# Patient Record
Sex: FEMALE | Race: WHITE | NOT HISPANIC OR LATINO | Employment: FULL TIME | URBAN - METROPOLITAN AREA
[De-identification: names, ages, dates, MRNs, and addresses within clinical notes are randomized per-mention and may not be internally consistent; named-entity substitution may affect disease eponyms.]

---

## 2024-01-18 PROBLEM — M21.70 UNEQUAL LEG LENGTH: Status: ACTIVE | Noted: 2022-05-24

## 2024-01-18 PROBLEM — M25.552 PAIN IN LEFT HIP: Status: ACTIVE | Noted: 2020-08-24

## 2024-01-18 PROBLEM — R05.3 CHRONIC COUGH: Status: ACTIVE | Noted: 2024-01-18

## 2024-01-18 PROBLEM — J30.1 ALLERGIC RHINITIS DUE TO POLLEN: Status: ACTIVE | Noted: 2024-01-18

## 2024-01-18 PROBLEM — R92.8 ABNORMAL MAMMOGRAM: Status: ACTIVE | Noted: 2017-11-27

## 2024-01-18 PROBLEM — R21 RASH: Status: ACTIVE | Noted: 2024-01-18

## 2024-01-18 PROBLEM — R00.0 SINUS TACHYCARDIA: Status: ACTIVE | Noted: 2024-01-18

## 2024-01-18 PROBLEM — E66.3 OVERWEIGHT (BMI 25.0-29.9): Status: ACTIVE | Noted: 2024-01-18

## 2024-01-18 PROBLEM — H10.45 CHRONIC ALLERGIC CONJUNCTIVITIS: Status: ACTIVE | Noted: 2024-01-18

## 2024-01-18 PROBLEM — J30.81 ALLERGIC RHINITIS DUE TO DOGS: Status: ACTIVE | Noted: 2024-01-18

## 2024-01-18 PROBLEM — R12 PYROSIS: Status: ACTIVE | Noted: 2024-01-18

## 2024-01-18 PROBLEM — M79.661 RIGHT CALF PAIN: Status: ACTIVE | Noted: 2024-01-18

## 2024-01-18 PROBLEM — J30.1 ALLERGY TO TREES: Status: ACTIVE | Noted: 2024-01-18

## 2024-01-18 PROBLEM — M19.071 PRIMARY OSTEOARTHRITIS OF RIGHT FOOT: Status: ACTIVE | Noted: 2022-05-03

## 2024-01-18 PROBLEM — F41.9 ANXIETY: Status: ACTIVE | Noted: 2024-01-18

## 2024-01-18 PROBLEM — J30.9 ALLERGIC RHINITIS: Status: ACTIVE | Noted: 2024-01-18

## 2024-01-18 PROBLEM — I20.9 ANGINA PECTORIS (CMS-HCC): Status: ACTIVE | Noted: 2024-01-18

## 2024-01-18 PROBLEM — N30.01 ACUTE CYSTITIS WITH HEMATURIA: Status: ACTIVE | Noted: 2024-01-18

## 2024-01-18 PROBLEM — M47.812 CERVICAL SPONDYLOSIS: Status: ACTIVE | Noted: 2020-08-24

## 2024-01-18 PROBLEM — M25.561 RIGHT KNEE PAIN: Status: ACTIVE | Noted: 2024-01-18

## 2024-01-18 PROBLEM — K21.9 GERD (GASTROESOPHAGEAL REFLUX DISEASE): Status: ACTIVE | Noted: 2021-03-26

## 2024-01-18 PROBLEM — M87.052 AVASCULAR NECROSIS OF HIP, LEFT (MULTI): Status: ACTIVE | Noted: 2021-03-24

## 2024-01-18 PROBLEM — Z96.642 STATUS POST TOTAL REPLACEMENT OF LEFT HIP: Status: ACTIVE | Noted: 2021-04-19

## 2024-01-18 PROBLEM — I10 BENIGN ESSENTIAL HYPERTENSION: Status: ACTIVE | Noted: 2024-01-18

## 2024-01-18 PROBLEM — I10 HTN (HYPERTENSION): Status: ACTIVE | Noted: 2021-03-26

## 2024-01-19 ENCOUNTER — OFFICE VISIT (OUTPATIENT)
Dept: PRIMARY CARE | Facility: CLINIC | Age: 69
End: 2024-01-19
Payer: COMMERCIAL

## 2024-01-19 VITALS
BODY MASS INDEX: 28.52 KG/M2 | DIASTOLIC BLOOD PRESSURE: 80 MMHG | WEIGHT: 155 LBS | TEMPERATURE: 97.6 F | HEIGHT: 62 IN | HEART RATE: 77 BPM | OXYGEN SATURATION: 99 % | SYSTOLIC BLOOD PRESSURE: 124 MMHG

## 2024-01-19 DIAGNOSIS — N39.0 URINARY TRACT INFECTION WITHOUT HEMATURIA, SITE UNSPECIFIED: Primary | ICD-10-CM

## 2024-01-19 DIAGNOSIS — R30.0 DYSURIA: ICD-10-CM

## 2024-01-19 LAB
POC APPEARANCE, URINE: CLEAR
POC BILIRUBIN, URINE: NEGATIVE
POC BLOOD, URINE: ABNORMAL
POC COLOR, URINE: ABNORMAL
POC GLUCOSE, URINE: NEGATIVE MG/DL
POC KETONES, URINE: NEGATIVE MG/DL
POC LEUKOCYTES, URINE: ABNORMAL
POC NITRITE,URINE: NEGATIVE
POC PH, URINE: 5 PH
POC PROTEIN, URINE: NEGATIVE MG/DL
POC SPECIFIC GRAVITY, URINE: <=1.005
POC UROBILINOGEN, URINE: 0.2 EU/DL

## 2024-01-19 PROCEDURE — 3074F SYST BP LT 130 MM HG: CPT | Performed by: INTERNAL MEDICINE

## 2024-01-19 PROCEDURE — 3079F DIAST BP 80-89 MM HG: CPT | Performed by: INTERNAL MEDICINE

## 2024-01-19 PROCEDURE — 81002 URINALYSIS NONAUTO W/O SCOPE: CPT | Performed by: INTERNAL MEDICINE

## 2024-01-19 PROCEDURE — 1159F MED LIST DOCD IN RCRD: CPT | Performed by: INTERNAL MEDICINE

## 2024-01-19 PROCEDURE — 99213 OFFICE O/P EST LOW 20 MIN: CPT | Performed by: INTERNAL MEDICINE

## 2024-01-19 RX ORDER — METOPROLOL TARTRATE 25 MG/1
25 TABLET, FILM COATED ORAL 2 TIMES DAILY
COMMUNITY

## 2024-01-19 RX ORDER — CEPHALEXIN 500 MG/1
500 CAPSULE ORAL 2 TIMES DAILY
Qty: 14 CAPSULE | Refills: 0 | Status: SHIPPED | OUTPATIENT
Start: 2024-01-19 | End: 2024-01-26

## 2024-01-19 RX ORDER — ALPRAZOLAM 0.5 MG/1
0.5 TABLET ORAL EVERY 6 HOURS PRN
COMMUNITY
End: 2024-01-24 | Stop reason: SDUPTHER

## 2024-01-19 NOTE — PROGRESS NOTES
Patient is seen my office today with chief complaint of frequency of micturition dysuria for last 2 or 3 days.  She has no fever or chill.  Denies any flank pain.  She has no nausea matting pain abdomen.  Review of other systems are negative.    On examination:  General examination: There is no acute discomfort  Eyes: There is no pallor or jaundice  Lungs: Clear to auscultation  CVS: Normal exam  Genitourinary system: There is no flank tenderness    Urine dip test: Positive for leukocyte esterase    Assessment and plan:  1.  UTI: Prescription for cephalexin is given to the patient she is allergic to Bactrim  2.  Dysuria: Not severe: No other specific treatment is given.  3.  Patient needs a refill on some of her regular medications.  She is advised to make a follow-up appointment with .

## 2024-01-24 ENCOUNTER — OFFICE VISIT (OUTPATIENT)
Dept: PRIMARY CARE | Facility: CLINIC | Age: 69
End: 2024-01-24
Payer: COMMERCIAL

## 2024-01-24 VITALS
BODY MASS INDEX: 28.67 KG/M2 | HEART RATE: 73 BPM | SYSTOLIC BLOOD PRESSURE: 120 MMHG | WEIGHT: 155.8 LBS | DIASTOLIC BLOOD PRESSURE: 74 MMHG | HEIGHT: 62 IN | TEMPERATURE: 97.4 F

## 2024-01-24 DIAGNOSIS — F41.9 ANXIETY: Primary | ICD-10-CM

## 2024-01-24 PROBLEM — M87.059 AVASCULAR NECROSIS OF BONE OF HIP (MULTI): Status: ACTIVE | Noted: 2021-03-24

## 2024-01-24 PROBLEM — J06.9 ACUTE UPPER RESPIRATORY INFECTION: Status: ACTIVE | Noted: 2024-01-24

## 2024-01-24 PROBLEM — R12 HEARTBURN: Status: ACTIVE | Noted: 2022-09-14

## 2024-01-24 PROBLEM — R30.0 DYSURIA: Status: ACTIVE | Noted: 2024-01-24

## 2024-01-24 PROBLEM — M79.661 RIGHT CALF PAIN: Status: RESOLVED | Noted: 2024-01-18 | Resolved: 2024-01-24

## 2024-01-24 PROBLEM — M25.561 RIGHT KNEE PAIN: Status: RESOLVED | Noted: 2024-01-18 | Resolved: 2024-01-24

## 2024-01-24 PROBLEM — J45.909 ASTHMA (HHS-HCC): Status: ACTIVE | Noted: 2024-01-24

## 2024-01-24 PROBLEM — R00.0 TACHYCARDIA: Status: ACTIVE | Noted: 2022-09-14

## 2024-01-24 PROBLEM — N30.00 ACUTE CYSTITIS: Status: ACTIVE | Noted: 2024-01-18

## 2024-01-24 PROBLEM — Z96.649 HISTORY OF TOTAL HIP REPLACEMENT: Status: ACTIVE | Noted: 2021-04-19

## 2024-01-24 PROCEDURE — 3078F DIAST BP <80 MM HG: CPT | Performed by: FAMILY MEDICINE

## 2024-01-24 PROCEDURE — 1036F TOBACCO NON-USER: CPT | Performed by: FAMILY MEDICINE

## 2024-01-24 PROCEDURE — 1159F MED LIST DOCD IN RCRD: CPT | Performed by: FAMILY MEDICINE

## 2024-01-24 PROCEDURE — 3074F SYST BP LT 130 MM HG: CPT | Performed by: FAMILY MEDICINE

## 2024-01-24 PROCEDURE — 99213 OFFICE O/P EST LOW 20 MIN: CPT | Performed by: FAMILY MEDICINE

## 2024-01-24 RX ORDER — ALBUTEROL SULFATE 90 UG/1
AEROSOL, METERED RESPIRATORY (INHALATION)
COMMUNITY
Start: 2023-01-20

## 2024-01-24 RX ORDER — ALPRAZOLAM 0.5 MG/1
0.5 TABLET ORAL DAILY PRN
Qty: 30 TABLET | Refills: 0 | Status: SHIPPED | OUTPATIENT
Start: 2024-01-24 | End: 2024-02-23

## 2024-01-24 RX ORDER — FLUTICASONE FUROATE AND VILANTEROL 100; 25 UG/1; UG/1
POWDER RESPIRATORY (INHALATION)
COMMUNITY
Start: 2023-01-20 | End: 2024-01-24 | Stop reason: WASHOUT

## 2024-01-24 ASSESSMENT — PATIENT HEALTH QUESTIONNAIRE - PHQ9
1. LITTLE INTEREST OR PLEASURE IN DOING THINGS: NOT AT ALL
SUM OF ALL RESPONSES TO PHQ9 QUESTIONS 1 AND 2: 0
2. FEELING DOWN, DEPRESSED OR HOPELESS: NOT AT ALL

## 2024-01-24 NOTE — PROGRESS NOTES
"    /74   Pulse 73   Temp 36.3 °C (97.4 °F)   Ht 1.575 m (5' 2\")   Wt 70.7 kg (155 lb 12.8 oz)   BMI 28.50 kg/m²     No past medical history on file.    Patient Active Problem List   Diagnosis    Inequality of length of lower extremity    History of total hip replacement    Tachycardia    Rash    Heartburn    Osteoarthritis of right foot    Low grade squamous intraepithelial lesion (LGSIL) on Papanicolaou smear of cervix    Pain in left hip    Overweight (BMI 25.0-29.9)    HTN (hypertension)    Gastroesophageal reflux disease    Genital herpes simplex    Intramural leiomyoma of uterus    Chronic cough    Chronic allergic conjunctivitis    Spondylosis of cervical spine    Benign essential hypertension    Avascular necrosis of bone of hip (CMS/HCC)    Anxiety    Angina pectoris (CMS/HCC)    Allergy to trees    Allergic rhinitis due to pollen    Allergic rhinitis due to animals    Allergic rhinitis    Acute cystitis    Abnormal mammogram    Acute upper respiratory infection    Asthma    Dysuria       Current Outpatient Medications   Medication Sig Dispense Refill    albuterol 90 mcg/actuation inhaler Inhale.      ALPRAZolam (Xanax) 0.5 mg tablet Take 1 tablet (0.5 mg) by mouth every 6 hours if needed.      cephalexin (Keflex) 500 mg capsule Take 1 capsule (500 mg) by mouth 2 times a day for 7 days. 14 capsule 0    metoprolol tartrate (Lopressor) 25 mg tablet Take 1 tablet (25 mg) by mouth 2 times a day.      fluticasone furoate-vilanteroL (Breo Ellipta) 100-25 mcg/dose inhaler Inhale.       No current facility-administered medications for this visit.       CC/HPI/ASSESSMENT/PLAN    CC anxiety    HPI patient 68-year-old female history of anxiety.  Patient uses Xanax very sparingly.  Last prescription a year ago.  This is not chronic use.  No substance agreement form is needed.  I have reviewed OARRS report which can be found in in the records.  Consider risk of abuse dependence addiction no concerns.  Patient " notes she takes Xanax medication rarely as needed and it helps calm the anxiety.  She denies fever chills.  She is recently treated for UTI notes that the urinary symptoms have improved.  We reviewed the urinalysis today    Exam calm psych calm pleasant female no psychosis eyes no jaundice Ext no edema    A/P 1.  Anxiety chronic stable.  OARRS reviewed.  Xanax refilled for  number 30 tablets.  No refills.  This is not chronic use.  Patient will follow-up as needed    There are no diagnoses linked to this encounter.

## 2024-04-23 ENCOUNTER — DOCUMENTATION (OUTPATIENT)
Dept: PRIMARY CARE | Facility: CLINIC | Age: 69
End: 2024-04-23
Payer: COMMERCIAL

## 2024-04-23 ENCOUNTER — PATIENT OUTREACH (OUTPATIENT)
Dept: PRIMARY CARE | Facility: CLINIC | Age: 69
End: 2024-04-23

## 2024-04-23 DIAGNOSIS — R00.0 SINUS TACHYCARDIA: ICD-10-CM

## 2024-04-23 NOTE — PROGRESS NOTES
Discharge Facility:  [facility]     Discharge Diagnosis:  Sinus tachycardia   -per pt needs follow up with PCP for repeat Chest xray    Admission Date:4/21/24  Discharge Date: 4/22/24    PCP Appointment Date:TBD-I am unable to make an appt due to no openings . Message sent to office staff requesting assistance. As well as encourged patient to call today to schedule.     Specialist Appointment Date: Patient is thinking about switching to Dr Eric Espinoza who she saw in the hospital.     6/18/24 appt for Tracy Schwab- Cardio currently     Hospital Encounter and Summary: Linked   See discharge assessment below for further details  Engagement  Call Start Time: 1016 (4/23/2024 10:26 AM)    Medications  Medications reviewed with patient/caregiver?: Yes (4/23/2024 10:26 AM)  Is the patient having any side effects they believe may be caused by any medication additions or changes?: No (4/23/2024 10:26 AM)  Does the patient have all medications ordered at discharge?: Not applicable (4/23/2024 10:26 AM)  Prescription Comments: no new , changed or stopped medications (4/23/2024 10:26 AM)  Is the patient taking all medications as directed (includes completed medication regime)?: Yes (4/23/2024 10:26 AM)    Appointments  Does the patient have a primary care provider?: Yes (4/23/2024 10:26 AM)  Care Management Interventions: Advised patient to make appointment (message sent to PCP office to call for follow up- pt requesting virtual appt) (4/23/2024 10:26 AM)  Has the patient kept scheduled appointments due by today?: Yes (4/23/2024 10:26 AM)  Care Management Interventions: Advised to schedule with specialist; Educated on importance of keeping appointment (Pt is deciding if she wants to change cardio to Dr Eric Espinoza who [facility]. She will keep her annual follow up in June with current cardio office at this time.) (4/23/2024 10:26 AM)    Self Management  Has home health visited the patient within 72 hours of  discharge?: Not applicable (4/23/2024 10:26 AM)  What Durable Medical Equipment (DME) was ordered?: n/a (4/23/2024 10:26 AM)    Patient Teaching  Does the patient have access to their discharge instructions?: Yes (4/23/2024 10:26 AM)  Care Management Interventions: Reviewed instructions with patient (4/23/2024 10:26 AM)  What is the patient's perception of their health status since discharge?: Improving (4/23/2024 10:26 AM)  Is the patient/caregiver able to teach back the hierarchy of who to call/visit for symptoms/problems? PCP, Specialist, Home Health nurse, Urgent Care, ED, 911: Yes (4/23/2024 10:26 AM)  Patient/Caregiver Education Comments: Patient deciding if she wants to swich PCP to who she saw in hospital due to access hard to get into Dr Teresa now but she has been seeing him for years. She was told to follow up with PCP to get repeat Chest x ray so agreed to send info to Dr Teresa to follow up until she decided. She would like a Scientific Intake appt if possible due to her work schedule. (4/23/2024 10:26 AM)    Wrap Up  Wrap Up Additional Comments: I introduced myself and the TCM program to Elissa Neumann. Reviewed hospital stay and answered any questions. I gave my contact information and encouraged to call me if needing assistance or has any further questions prior to my next outreach. (4/23/2024 10:26 AM)  Call End Time: 1032 (4/23/2024 10:26 AM)

## 2024-04-25 ENCOUNTER — TELEPHONE (OUTPATIENT)
Dept: PRIMARY CARE | Facility: CLINIC | Age: 69
End: 2024-04-25
Payer: COMMERCIAL

## 2024-04-25 NOTE — TELEPHONE ENCOUNTER
----- Message from Nicole Dailey sent at 4/25/2024  2:47 PM EDT -----  Regarding: RE: TCM visit needed  APT 5-15-24 Next available with her Dr.  Patient does not want to see any other physicians.  ----- Message -----  From: Jocelyn Mckinney LPN  Sent: 4/23/2024  10:26 AM EDT  [facility] [place] [id number] [facility]  Subject: TCM visit needed                                 Hello,  Can you please contact pt to schedule hospital follow up within 14 days of discharge.  Patient is back to work already and requesting a virtual appt if possible.       Discharge Facility:  [facility]     Discharge Diagnosis:  Sinus tachycardia   -per pt needs follow up with PCP for repeat Chest xray    Admission Date:4/21/24  Discharge Date: 4/22/24     Thank you,  Jocelyn Mckinney LPN   Care Transitions Specialist

## 2024-05-15 ENCOUNTER — PATIENT OUTREACH (OUTPATIENT)
Dept: PRIMARY CARE | Facility: CLINIC | Age: 69
End: 2024-05-15

## 2024-05-15 ENCOUNTER — OFFICE VISIT (OUTPATIENT)
Dept: PRIMARY CARE | Facility: CLINIC | Age: 69
End: 2024-05-15
Payer: COMMERCIAL

## 2024-05-15 VITALS
TEMPERATURE: 98.2 F | HEIGHT: 62 IN | DIASTOLIC BLOOD PRESSURE: 78 MMHG | WEIGHT: 157.6 LBS | HEART RATE: 82 BPM | SYSTOLIC BLOOD PRESSURE: 136 MMHG | BODY MASS INDEX: 29 KG/M2

## 2024-05-15 DIAGNOSIS — R00.0 SINUS TACHYCARDIA: Primary | ICD-10-CM

## 2024-05-15 DIAGNOSIS — R05.9 COUGH, UNSPECIFIED TYPE: ICD-10-CM

## 2024-05-15 PROCEDURE — 3078F DIAST BP <80 MM HG: CPT | Performed by: FAMILY MEDICINE

## 2024-05-15 PROCEDURE — 1159F MED LIST DOCD IN RCRD: CPT | Performed by: FAMILY MEDICINE

## 2024-05-15 PROCEDURE — 3075F SYST BP GE 130 - 139MM HG: CPT | Performed by: FAMILY MEDICINE

## 2024-05-15 PROCEDURE — 1160F RVW MEDS BY RX/DR IN RCRD: CPT | Performed by: FAMILY MEDICINE

## 2024-05-15 PROCEDURE — 99213 OFFICE O/P EST LOW 20 MIN: CPT | Performed by: FAMILY MEDICINE

## 2024-05-15 RX ORDER — VALACYCLOVIR HYDROCHLORIDE 500 MG/1
500 TABLET, FILM COATED ORAL DAILY
COMMUNITY
Start: 2024-02-02

## 2024-05-15 ASSESSMENT — PATIENT HEALTH QUESTIONNAIRE - PHQ9
1. LITTLE INTEREST OR PLEASURE IN DOING THINGS: NOT AT ALL
2. FEELING DOWN, DEPRESSED OR HOPELESS: NOT AT ALL
SUM OF ALL RESPONSES TO PHQ9 QUESTIONS 1 AND 2: 0

## 2024-05-15 NOTE — PROGRESS NOTES
Call regarding appt. with PCP on 5/15/24 after hospitalization.  Pt has her appt this afternoon. Unable to do a virtual as she had hoped and soonest they could get her in to see Brii was today. She opted to wait so she could see her own PCP>     At time of outreach call the patient feels as if their condition has returned to baseline since last visit.  Elissa reported she is feeling fine and addressed any questions or concerns. She will reach out to me if needs any assistance prior to my next outreach.

## 2024-05-15 NOTE — PROGRESS NOTES
"    /78   Pulse 82   Temp 36.8 °C (98.2 °F)   Ht 1.575 m (5' 2\")   Wt 71.5 kg (157 lb 9.6 oz)   BMI 28.83 kg/m²     No past medical history on file.    Patient Active Problem List   Diagnosis    Inequality of length of lower extremity    History of total hip replacement    Sinus tachycardia    Rash    Heartburn    Osteoarthritis of right foot    Low grade squamous intraepithelial lesion (LGSIL) on Papanicolaou smear of cervix    Pain in left hip    Overweight (BMI 25.0-29.9)    HTN (hypertension)    Gastroesophageal reflux disease    Genital herpes simplex    Intramural leiomyoma of uterus    Chronic cough    Chronic allergic conjunctivitis    Spondylosis of cervical spine    Benign essential hypertension    Avascular necrosis of bone of hip (Multi)    Anxiety    Angina pectoris (CMS-HCC)    Allergy to trees    Allergic rhinitis due to pollen    Allergic rhinitis due to animals    Allergic rhinitis    Acute cystitis    Abnormal mammogram    Acute upper respiratory infection    Asthma ([facility]-H[facility])    Dysuria       Current Outpatient Medications   Medication Sig Dispense Refill    albuterol 90 mcg/actuation inhaler Inhale.      metoprolol tartrate (Lopressor) 25 mg tablet Take 1 tablet (25 mg) by mouth 2 times a day.      valACYclovir (Valtrex) 500 mg tablet Take 1 tablet (500 mg) by mouth once daily.      ALPRAZolam (Xanax) 0.5 mg tablet Take 1 tablet (0.5 mg) by mouth once daily as needed for anxiety. 30 tablet 0     No current facility-administered medications for this visit.       CC/HPI/ASSESSMENT/PLAN    CC follow-up hospital    HPI patient was in emergency department 1 month ago for sinus tachycardia.  Reviewed blood test and chest x-ray.  Chest x-ray showed possible small pleural effusion.  Patient notes she is feeling well.  Denies fever chills chest pain palpitation short of breath.  She is taking metoprolol 25 mg twice daily.  She is scheduled to see cardiology next month    Exam calm neck supple no " LAD lungs CTA CV RRR no murmur EXT no edema skin no rash    A/P 1 sinus tachycardia.  Blood pressure and heart rate normal today.  Continue metoprolol 25 mg twice daily.  #2 cough.  Repeat chest x-ray is ordered.  Follow-up cardiology as previously arranged    There are no diagnoses linked to this encounter.

## 2024-05-22 PROBLEM — R05.9 COUGH: Status: ACTIVE | Noted: 2024-01-18

## 2024-06-18 ENCOUNTER — APPOINTMENT (OUTPATIENT)
Dept: CARDIOLOGY | Facility: CLINIC | Age: 69
End: 2024-06-18
Payer: COMMERCIAL

## 2024-06-18 VITALS
WEIGHT: 153.2 LBS | OXYGEN SATURATION: 97 % | SYSTOLIC BLOOD PRESSURE: 130 MMHG | DIASTOLIC BLOOD PRESSURE: 80 MMHG | HEART RATE: 72 BPM | HEIGHT: 62 IN | BODY MASS INDEX: 28.19 KG/M2

## 2024-06-18 DIAGNOSIS — I47.19 ATRIAL TACHYCARDIA (CMS-HCC): Primary | ICD-10-CM

## 2024-06-18 DIAGNOSIS — R00.0 SINUS TACHYCARDIA: ICD-10-CM

## 2024-06-18 DIAGNOSIS — I10 BENIGN ESSENTIAL HYPERTENSION: ICD-10-CM

## 2024-06-18 PROCEDURE — 1126F AMNT PAIN NOTED NONE PRSNT: CPT | Performed by: NURSE PRACTITIONER

## 2024-06-18 PROCEDURE — 1036F TOBACCO NON-USER: CPT | Performed by: NURSE PRACTITIONER

## 2024-06-18 PROCEDURE — 3079F DIAST BP 80-89 MM HG: CPT | Performed by: NURSE PRACTITIONER

## 2024-06-18 PROCEDURE — 3075F SYST BP GE 130 - 139MM HG: CPT | Performed by: NURSE PRACTITIONER

## 2024-06-18 PROCEDURE — 1160F RVW MEDS BY RX/DR IN RCRD: CPT | Performed by: NURSE PRACTITIONER

## 2024-06-18 PROCEDURE — 1159F MED LIST DOCD IN RCRD: CPT | Performed by: NURSE PRACTITIONER

## 2024-06-18 PROCEDURE — 99214 OFFICE O/P EST MOD 30 MIN: CPT | Performed by: NURSE PRACTITIONER

## 2024-06-18 RX ORDER — METOPROLOL TARTRATE 25 MG/1
25 TABLET, FILM COATED ORAL 2 TIMES DAILY
Qty: 180 TABLET | Refills: 3 | Status: SHIPPED | OUTPATIENT
Start: 2024-06-18 | End: 2025-06-18

## 2024-06-18 ASSESSMENT — PAIN SCALES - GENERAL: PAINLEVEL: 0-NO PAIN

## 2024-06-18 NOTE — PROGRESS NOTES
Name : Elissa Neuamnn   : 1955   MRN : [id number]   ENC Date : 2024    CC: 1 year follow up     HPI:    Elissa Neumann is a 68 y.o. female Sinus Tachycardia & HTN who presents today for annual cardiovascular follow up.    [facility] ER visit in 2024 for upper respiratory symptoms & palpitations, found to have atrial tachycardia, evaluated by cardiology. Continued on her home dose metoprolol. Cardiology advised her she can take an extra dose of metoprolol if recurrence of symptoms.    Since then Elissa reports no recurrence & has been doing well. Walking & exercising again with light weights, no associated CP/ABD pain/NV nor lightheadedness. No syncope  CV Diagnostics:  Stress echo 10/27/21: Normal, peak EF 75%    Echo 20: EF 65-70%, trace MR, trace to mild TR, RVSP 30.9 mmHg    ROS: unless otherwise noted in the history of present illness, all other systems were reviewed and they are negative for complaints     Allergies:  Tizanidine and Sulfa (sulfonamide antibiotics)    Current Outpatient Medications   Medication Instructions    albuterol 90 mcg/actuation inhaler inhalation    ALPRAZolam (XANAX) 0.5 mg, oral, Daily PRN    metoprolol tartrate (LOPRESSOR) 25 mg, oral, 2 times daily    valACYclovir (VALTREX) 500 mg, oral, Daily        Last Labs:  CBC  Lab Results   Component Value Date    WBC 4.6 2020    HGB 12.9 2020    HCT 39.0 2020    MCV 99 2020     2020       CMP  Lab Results   Component Value Date    CALCIUM 8.8 2020    PROT 8.4 (H) 2020    ALBUMIN 5.1 (H) 2020    AST 24 2020    ALT 18 2020    ALKPHOS 88 2020    BILITOT 0.7 2020       BMP   Lab Results   Component Value Date     2020    K 3.7 2020     2020    CO2 26 2020    GLUCOSE 107 (H) 2020    BUN 5 (L) 2020    CREATININE 0.56 2020       LIPID PANEL   Lab Results   Component Value Date    CHOL 247 (H) 2020  "   TRIG 114 02/08/2020    HDL 66.0 02/08/2020    CHHDL 3.7 02/08/2020    LDLF 158 (H) 02/08/2020    VLDL 23 02/08/2020       RENAL FUNCTION PANEL   Lab Results   Component Value Date    GLUCOSE 107 (H) 02/08/2020     02/08/2020    K 3.7 02/08/2020     02/08/2020    CO2 26 02/08/2020    ANIONGAP 11 02/08/2020    BUN 5 (L) 02/08/2020    CREATININE 0.56 02/08/2020    CALCIUM 8.8 02/08/2020    ALBUMIN 5.1 (H) 02/07/2020        Lab Results   Component Value Date    BNP 48 02/07/2020     I have reviewed the above labs & diagnostics    Last Recorded Vitals:  Vitals:    06/18/24 0857   BP: 130/80   BP Location: Left arm   Patient Position: Sitting   BP Cuff Size: Adult   Pulse: 72   SpO2: 97%   Weight: 69.5 kg (153 lb 3.2 oz)   Height: 1.575 m (5' 2\")     Physical Exam:  On exam Ms. Elissa Neumann appears her stated age, is alert and oriented x3, and in no acute distress. Her sclera are anicteric and her oropharynx has moist mucous membranes. Her neck is supple and without thyromegaly. The JVP is ~5 cm of water above the right atrium. Her cardiac exam has regular rhythm, normal S1, S2. No S3/4. There are no murmurs. Her lungs are clear to auscultation bilaterally and there is no dullness to percussion. Her abdomen is soft, nontender with normoactive bowel sounds. There is no HJR. The extremities are warm and without edema. The skin is dry. There is no rash present. The distal pulses are 2-3+ in all four extremities. Her mood and affect are appropriate for todays encounter.     Assessment/Plan:  1. Sinus Tachycardia. No further episodes of palpitations/SOB. HR 75 bpm. Discussed increase metoprolol to 50mg BID but since Elissa has not had any further events she would like to continue on 25mg BID at this time. Discussed increasing dose is an option if recurrence.    2. Atrial tachycardia. Documented during visit at [facility]. No adjustments in medical therapy. EKG today shows SR with DARBY 118    3. Hypertension. /80 " mmHg today. Relatively controlled     Follow up in 1 year or as needed      Tracy M Schwab, APRN-CNP

## 2024-06-27 ENCOUNTER — PATIENT OUTREACH (OUTPATIENT)
Dept: PRIMARY CARE | Facility: CLINIC | Age: 69
End: 2024-06-27
Payer: COMMERCIAL

## 2024-07-17 ENCOUNTER — PATIENT OUTREACH (OUTPATIENT)
Dept: PRIMARY CARE | Facility: CLINIC | Age: 69
End: 2024-07-17
Payer: COMMERCIAL

## 2024-07-17 NOTE — PROGRESS NOTES
If unable to contact patient:  Final call back to assess needs post discharge. Left voicemail for patient. Contact information provided.

## 2024-09-11 DIAGNOSIS — Z12.31 ENCOUNTER FOR SCREENING MAMMOGRAM FOR BREAST CANCER: ICD-10-CM

## 2025-06-03 ENCOUNTER — APPOINTMENT (OUTPATIENT)
Dept: CARDIOLOGY | Facility: CLINIC | Age: 70
End: 2025-06-03
Payer: COMMERCIAL

## 2025-06-03 VITALS
BODY MASS INDEX: 29.37 KG/M2 | SYSTOLIC BLOOD PRESSURE: 120 MMHG | HEIGHT: 62 IN | DIASTOLIC BLOOD PRESSURE: 90 MMHG | RESPIRATION RATE: 18 BRPM | OXYGEN SATURATION: 99 % | HEART RATE: 71 BPM | WEIGHT: 159.6 LBS

## 2025-06-03 DIAGNOSIS — I10 PRIMARY HYPERTENSION: ICD-10-CM

## 2025-06-03 DIAGNOSIS — R00.0 SINUS TACHYCARDIA: ICD-10-CM

## 2025-06-03 DIAGNOSIS — I47.19 ATRIAL TACHYCARDIA: Primary | ICD-10-CM

## 2025-06-03 PROCEDURE — 3079F DIAST BP 80-89 MM HG: CPT | Performed by: NURSE PRACTITIONER

## 2025-06-03 PROCEDURE — 3008F BODY MASS INDEX DOCD: CPT | Performed by: NURSE PRACTITIONER

## 2025-06-03 PROCEDURE — 93000 ELECTROCARDIOGRAM COMPLETE: CPT | Performed by: NURSE PRACTITIONER

## 2025-06-03 PROCEDURE — 1159F MED LIST DOCD IN RCRD: CPT | Performed by: NURSE PRACTITIONER

## 2025-06-03 PROCEDURE — 3074F SYST BP LT 130 MM HG: CPT | Performed by: NURSE PRACTITIONER

## 2025-06-03 PROCEDURE — 99214 OFFICE O/P EST MOD 30 MIN: CPT | Performed by: NURSE PRACTITIONER

## 2025-06-03 PROCEDURE — 1036F TOBACCO NON-USER: CPT | Performed by: NURSE PRACTITIONER

## 2025-06-03 RX ORDER — METOPROLOL TARTRATE 25 MG/1
25 TABLET, FILM COATED ORAL 2 TIMES DAILY
Qty: 180 TABLET | Refills: 3 | Status: SHIPPED | OUTPATIENT
Start: 2025-06-03 | End: 2026-06-03

## 2025-06-03 NOTE — PROGRESS NOTES
Name : Elissa Neumann   : 1955   MRN : [id number]   ENC Date : 2025    CC: Annual Exam     HPI:    Elissa Neumann is a 69 y.o. female Sinus Tachycardia, Atrial Tachycardia & HTN who presents today for annual cardiovascular follow up.    [facility] ER visit in 2024 for upper respiratory symptoms & palpitations, found to have atrial tachycardia, evaluated by cardiology. Continued on her home dose metoprolol. Cardiology advised her she can take an extra dose of metoprolol if recurrence of symptoms.    Since then Elissa reports no recurrence & has been doing well. Walking & exercising again with light weights, no associated CP/ABD pain/NV nor lightheadedness. No syncope    No exercise.  CV Diagnostics:  Stress echo 10/27/21: Normal, peak EF 75%    Echo 20: EF 65-70%, trace MR, trace to mild TR, RVSP 30.9 mmHg    ROS: unless otherwise noted in the history of present illness, all other systems were reviewed and they are negative for complaints     Allergies:  Tizanidine and Sulfa (sulfonamide antibiotics)    Current Outpatient Medications   Medication Instructions    albuterol 90 mcg/actuation inhaler Inhale.    ALPRAZolam (XANAX) 0.5 mg, oral, Daily PRN    metoprolol tartrate (LOPRESSOR) 25 mg, oral, 2 times daily    valACYclovir (VALTREX) 500 mg, Daily        Last Labs:  CBC  Lab Results   Component Value Date    WBC 4.6 2020    HGB 12.9 2020    HCT 39.0 2020    MCV 99 2020     2020       CMP  Lab Results   Component Value Date    CALCIUM 8.8 2020    PROT 8.4 (H) 2020    ALBUMIN 5.1 (H) 2020    AST 24 2020    ALT 18 2020    ALKPHOS 88 2020    BILITOT 0.7 2020       BMP   Lab Results   Component Value Date     2020    K 3.7 2020     2020    CO2 26 2020    GLUCOSE 107 (H) 2020    BUN 5 (L) 2020    CREATININE 0.56 2020       LIPID PANEL   Lab Results   Component Value Date     "CHOL 247 (H) 02/08/2020    TRIG 114 02/08/2020    HDL 66.0 02/08/2020    CHHDL 3.7 02/08/2020    LDLF 158 (H) 02/08/2020    VLDL 23 02/08/2020       RENAL FUNCTION PANEL   Lab Results   Component Value Date    GLUCOSE 107 (H) 02/08/2020     02/08/2020    K 3.7 02/08/2020     02/08/2020    CO2 26 02/08/2020    ANIONGAP 11 02/08/2020    BUN 5 (L) 02/08/2020    CREATININE 0.56 02/08/2020    CALCIUM 8.8 02/08/2020    ALBUMIN 5.1 (H) 02/07/2020        Lab Results   Component Value Date    BNP 48 02/07/2020     I have reviewed the above labs & diagnostics    Last Recorded Vitals:  Vitals:    06/03/25 0822 06/03/25 0844   BP: 130/80 120/90   BP Location: Left arm Right arm   Patient Position: Sitting Sitting   Pulse: 71    Resp: 18    SpO2: 99%    Weight: 72.4 kg (159 lb 9.6 oz)    Height: 1.575 m (5' 2\")      Physical Exam:  On exam Ms. Elissa Neumann appears her stated age, is alert and oriented x3, and in no acute distress. Her sclera are anicteric and her oropharynx has moist mucous membranes. Her neck is supple and without thyromegaly. The JVP is ~5 cm of water above the right atrium. Her cardiac exam has regular rhythm, normal S1, S2. No S3/4. There are no murmurs. Her lungs are clear to auscultation bilaterally and there is no dullness to percussion. Her abdomen is soft, nontender with normoactive bowel sounds. There is no HJR. The extremities are warm and without edema. The skin is dry. There is no rash present. The distal pulses are 2-3+ in all four extremities. Her mood and affect are appropriate for todays encounter.     Assessment/Plan:  1. Sinus Tachycardia.   2. Atrial tachycardia. Documented during visit at [facility] 2024. No further episodes of palpitations/SOB. C/w Metoprolol tartrate 25mg BID    3. Hypertension. /90 mmHg today. nicely controlled     Follow up with Dr. Garcia in 6 month to establish collaborative care & me in 1 year.     Tracy M Schwab, APRN-CNP    "

## 2025-06-09 ENCOUNTER — APPOINTMENT (OUTPATIENT)
Dept: PRIMARY CARE | Facility: CLINIC | Age: 70
End: 2025-06-09
Payer: COMMERCIAL

## 2025-06-09 VITALS
BODY MASS INDEX: 29.7 KG/M2 | DIASTOLIC BLOOD PRESSURE: 90 MMHG | HEIGHT: 62 IN | HEART RATE: 67 BPM | WEIGHT: 161.4 LBS | SYSTOLIC BLOOD PRESSURE: 148 MMHG | TEMPERATURE: 97.8 F

## 2025-06-09 DIAGNOSIS — F41.9 ANXIETY: ICD-10-CM

## 2025-06-09 DIAGNOSIS — E55.9 VITAMIN D DEFICIENCY: ICD-10-CM

## 2025-06-09 DIAGNOSIS — I10 PRIMARY HYPERTENSION: ICD-10-CM

## 2025-06-09 DIAGNOSIS — R10.84 GENERALIZED ABDOMINAL PAIN: Primary | ICD-10-CM

## 2025-06-09 PROBLEM — N30.00 ACUTE CYSTITIS: Status: RESOLVED | Noted: 2024-01-18 | Resolved: 2025-06-09

## 2025-06-09 PROBLEM — G57.92 NEUROPATHY OF LEFT LOWER EXTREMITY: Status: ACTIVE | Noted: 2025-04-08

## 2025-06-09 PROBLEM — R30.0 DYSURIA: Status: RESOLVED | Noted: 2024-01-24 | Resolved: 2025-06-09

## 2025-06-09 PROBLEM — J06.9 ACUTE UPPER RESPIRATORY INFECTION: Status: RESOLVED | Noted: 2024-01-24 | Resolved: 2025-06-09

## 2025-06-09 PROBLEM — J45.909 ASTHMA: Status: RESOLVED | Noted: 2024-01-24 | Resolved: 2025-06-09

## 2025-06-09 PROBLEM — R12 HEARTBURN: Status: RESOLVED | Noted: 2022-09-14 | Resolved: 2025-06-09

## 2025-06-09 PROCEDURE — 1125F AMNT PAIN NOTED PAIN PRSNT: CPT | Performed by: FAMILY MEDICINE

## 2025-06-09 PROCEDURE — 3008F BODY MASS INDEX DOCD: CPT | Performed by: FAMILY MEDICINE

## 2025-06-09 PROCEDURE — 3077F SYST BP >= 140 MM HG: CPT | Performed by: FAMILY MEDICINE

## 2025-06-09 PROCEDURE — 1158F ADVNC CARE PLAN TLK DOCD: CPT | Performed by: FAMILY MEDICINE

## 2025-06-09 PROCEDURE — 1123F ACP DISCUSS/DSCN MKR DOCD: CPT | Performed by: FAMILY MEDICINE

## 2025-06-09 PROCEDURE — 99214 OFFICE O/P EST MOD 30 MIN: CPT | Performed by: FAMILY MEDICINE

## 2025-06-09 PROCEDURE — 3080F DIAST BP >= 90 MM HG: CPT | Performed by: FAMILY MEDICINE

## 2025-06-09 PROCEDURE — 1159F MED LIST DOCD IN RCRD: CPT | Performed by: FAMILY MEDICINE

## 2025-06-09 PROCEDURE — 1036F TOBACCO NON-USER: CPT | Performed by: FAMILY MEDICINE

## 2025-06-09 RX ORDER — ALPRAZOLAM 0.5 MG/1
0.5 TABLET ORAL DAILY PRN
Qty: 30 TABLET | Refills: 0 | Status: SHIPPED | OUTPATIENT
Start: 2025-06-09 | End: 2025-07-09

## 2025-06-09 ASSESSMENT — PATIENT HEALTH QUESTIONNAIRE - PHQ9
2. FEELING DOWN, DEPRESSED OR HOPELESS: NOT AT ALL
1. LITTLE INTEREST OR PLEASURE IN DOING THINGS: NOT AT ALL
SUM OF ALL RESPONSES TO PHQ9 QUESTIONS 1 AND 2: 0

## 2025-06-09 ASSESSMENT — PAIN SCALES - GENERAL: PAINLEVEL_OUTOF10: 2

## 2025-06-09 NOTE — PROGRESS NOTES
"    /90   Pulse 67   Temp 36.6 °C (97.8 °F)   Ht 1.575 m (5' 2\")   Wt 73.2 kg (161 lb 6.4 oz)   BMI 29.52 kg/m²     Medical History[1]    Problem List[2]    Current Medications[3]    CC/HPI/ASSESSMENT/PLAN    CC follow-up medication    HPI patient history of hypertension, blood pressure has been under good control.  She recently saw cardiology.  Blood pressure slightly elevated today, will monitor at home.  Patient notes has been experiencing intermittent right sided abdominal pain for the last few months.  The pain comes and goes.  Unrelated to food or activities.  She had colonoscopy 2 years ago that was normal.  Denies blood in urine or stool.  Denies fever chills chest pain vomiting diarrhea.  Patient with a history of significant anxiety using Xanax very sparingly when her anxiety intensifies.  We discussed pursuing CAT scan, patient notes she will need Xanax to get through the CAT scan.  OARRS reports been reviewed.    Exam calm eyes no jaundice neck supple lungs CTA CV RRR abdomen soft mild discomfort diffusely in the right lateral abdominal area no rebound or guarding.  No abdominal mass palpable.  Psych calm pleasant female no psychosis    A/P 1 generalized abdominal pain.  Given the fact that the pain has been on and off for few months I have recommended CAT scan abdomen and pelvis with and without contrast to properly evaluate.  Colonoscopy 2 years ago was normal.  Blood work is ordered.  #2 anxiety.  Xanax is refilled.  OARRS report reviewed.  #3 hypertension stable monitor blood pressure at home.  Will await testing results.  Blood work is ordered.    There are no diagnoses linked to this encounter.          [1] History reviewed. No pertinent past medical history.  [2]   Patient Active Problem List  Diagnosis    Inequality of length of lower extremity    History of total hip replacement    Sinus tachycardia    Rash    Osteoarthritis of right foot    Low grade squamous intraepithelial lesion " (LGSIL) on Papanicolaou smear of cervix    Pain in left hip    Overweight (BMI 25.0-29.9)    HTN (hypertension)    Gastroesophageal reflux disease    Genital herpes simplex    Intramural leiomyoma of uterus    Cough    Chronic allergic conjunctivitis    Spondylosis of cervical spine    Benign essential hypertension    Avascular necrosis of bone of hip (Multi)    Anxiety    Angina pectoris    Allergy to trees    Allergic rhinitis due to pollen    Allergic rhinitis due to animals    Allergic rhinitis    Acute cystitis    Abnormal mammogram    Acute upper respiratory infection    Atrial tachycardia    Neuropathy of left lower extremity   [3]   Current Outpatient Medications   Medication Sig Dispense Refill    metoprolol tartrate (Lopressor) 25 mg tablet Take 1 tablet (25 mg) by mouth 2 times a day. 180 tablet 3    valACYclovir (Valtrex) 500 mg tablet Take 1 tablet (500 mg) by mouth once daily.      albuterol 90 mcg/actuation inhaler Inhale. (Patient not taking: Reported on 6/9/2025)      ALPRAZolam (Xanax) 0.5 mg tablet Take 1 tablet (0.5 mg) by mouth once daily as needed for anxiety. 30 tablet 0     No current facility-administered medications for this visit.

## 2025-06-17 ENCOUNTER — HOSPITAL ENCOUNTER (OUTPATIENT)
Dept: RADIOLOGY | Facility: HOSPITAL | Age: 70
Discharge: HOME | End: 2025-06-17
Payer: COMMERCIAL

## 2025-06-17 ENCOUNTER — APPOINTMENT (OUTPATIENT)
Dept: CARDIOLOGY | Facility: CLINIC | Age: 70
End: 2025-06-17
Payer: COMMERCIAL

## 2025-06-17 DIAGNOSIS — R10.84 GENERALIZED ABDOMINAL PAIN: ICD-10-CM

## 2025-06-17 LAB
25(OH)D3+25(OH)D2 SERPL-MCNC: 14 NG/ML (ref 30–100)
ALBUMIN SERPL-MCNC: 4.3 G/DL (ref 3.6–5.1)
ALP SERPL-CCNC: 51 U/L (ref 37–153)
ALT SERPL-CCNC: 20 U/L (ref 6–29)
ANION GAP SERPL CALCULATED.4IONS-SCNC: 7 MMOL/L (CALC) (ref 7–17)
AST SERPL-CCNC: 25 U/L (ref 10–35)
BASOPHILS # BLD AUTO: 20 CELLS/UL (ref 0–200)
BASOPHILS NFR BLD AUTO: 0.5 %
BILIRUB SERPL-MCNC: 0.6 MG/DL (ref 0.2–1.2)
BUN SERPL-MCNC: 12 MG/DL (ref 7–25)
CALCIUM SERPL-MCNC: 8.9 MG/DL (ref 8.6–10.4)
CHLORIDE SERPL-SCNC: 105 MMOL/L (ref 98–110)
CHOLEST SERPL-MCNC: 298 MG/DL
CHOLEST/HDLC SERPL: 5.1 (CALC)
CO2 SERPL-SCNC: 29 MMOL/L (ref 20–32)
CREAT SERPL-MCNC: 0.57 MG/DL (ref 0.5–1.05)
EGFRCR SERPLBLD CKD-EPI 2021: 99 ML/MIN/1.73M2
EOSINOPHIL # BLD AUTO: 152 CELLS/UL (ref 15–500)
EOSINOPHIL NFR BLD AUTO: 3.8 %
ERYTHROCYTE [DISTWIDTH] IN BLOOD BY AUTOMATED COUNT: 13.1 % (ref 11–15)
GLUCOSE SERPL-MCNC: 101 MG/DL (ref 65–99)
HCT VFR BLD AUTO: 42.2 % (ref 35–45)
HDLC SERPL-MCNC: 58 MG/DL
HGB BLD-MCNC: 13.8 G/DL (ref 11.7–15.5)
LDLC SERPL CALC-MCNC: 204 MG/DL (CALC)
LYMPHOCYTES # BLD AUTO: 1472 CELLS/UL (ref 850–3900)
LYMPHOCYTES NFR BLD AUTO: 36.8 %
MCH RBC QN AUTO: 30.9 PG (ref 27–33)
MCHC RBC AUTO-ENTMCNC: 32.7 G/DL (ref 32–36)
MCV RBC AUTO: 94.6 FL (ref 80–100)
MONOCYTES # BLD AUTO: 420 CELLS/UL (ref 200–950)
MONOCYTES NFR BLD AUTO: 10.5 %
NEUTROPHILS # BLD AUTO: 1936 CELLS/UL (ref 1500–7800)
NEUTROPHILS NFR BLD AUTO: 48.4 %
NONHDLC SERPL-MCNC: 240 MG/DL (CALC)
PLATELET # BLD AUTO: 254 THOUSAND/UL (ref 140–400)
PMV BLD REES-ECKER: 9.9 FL (ref 7.5–12.5)
POTASSIUM SERPL-SCNC: 4.6 MMOL/L (ref 3.5–5.3)
PROT SERPL-MCNC: 6.8 G/DL (ref 6.1–8.1)
RBC # BLD AUTO: 4.46 MILLION/UL (ref 3.8–5.1)
SODIUM SERPL-SCNC: 141 MMOL/L (ref 135–146)
TRIGL SERPL-MCNC: 185 MG/DL
TSH SERPL-ACNC: 1.18 MIU/L (ref 0.4–4.5)
WBC # BLD AUTO: 4 THOUSAND/UL (ref 3.8–10.8)

## 2025-06-17 PROCEDURE — 74177 CT ABD & PELVIS W/CONTRAST: CPT | Performed by: RADIOLOGY

## 2025-06-17 PROCEDURE — 74177 CT ABD & PELVIS W/CONTRAST: CPT

## 2025-06-17 PROCEDURE — 2550000001 HC RX 255 CONTRASTS: Performed by: FAMILY MEDICINE

## 2025-06-17 RX ADMIN — IOHEXOL 75 ML: 350 INJECTION, SOLUTION INTRAVENOUS at 09:21

## 2025-06-24 ENCOUNTER — TELEPHONE (OUTPATIENT)
Dept: PRIMARY CARE | Facility: CLINIC | Age: 70
End: 2025-06-24
Payer: COMMERCIAL

## 2025-06-24 NOTE — TELEPHONE ENCOUNTER
Pt is concerned about going on cholesterol medication and would like to talk to you about it before she may start it. She is unable to get in with you 2 months out.   Please advise

## 2025-07-16 ENCOUNTER — APPOINTMENT (OUTPATIENT)
Dept: CARDIOLOGY | Facility: CLINIC | Age: 70
End: 2025-07-16
Payer: COMMERCIAL

## 2025-07-29 ENCOUNTER — APPOINTMENT (OUTPATIENT)
Dept: CARDIOLOGY | Facility: CLINIC | Age: 70
End: 2025-07-29
Payer: COMMERCIAL

## 2025-07-31 ENCOUNTER — APPOINTMENT (OUTPATIENT)
Dept: CARDIOLOGY | Facility: CLINIC | Age: 70
End: 2025-07-31
Payer: COMMERCIAL

## 2025-08-11 ENCOUNTER — APPOINTMENT (OUTPATIENT)
Dept: PRIMARY CARE | Facility: CLINIC | Age: 70
End: 2025-08-11
Payer: COMMERCIAL

## 2025-08-11 VITALS
DIASTOLIC BLOOD PRESSURE: 70 MMHG | BODY MASS INDEX: 29.08 KG/M2 | HEART RATE: 64 BPM | HEIGHT: 62 IN | SYSTOLIC BLOOD PRESSURE: 140 MMHG | TEMPERATURE: 97.7 F | WEIGHT: 158 LBS

## 2025-08-11 DIAGNOSIS — N93.9 VAGINAL BLEEDING: ICD-10-CM

## 2025-08-11 DIAGNOSIS — D25.1 INTRAMURAL LEIOMYOMA OF UTERUS: ICD-10-CM

## 2025-08-11 DIAGNOSIS — E78.5 HYPERLIPIDEMIA, UNSPECIFIED HYPERLIPIDEMIA TYPE: Primary | ICD-10-CM

## 2025-08-11 DIAGNOSIS — E55.9 VITAMIN D DEFICIENCY: ICD-10-CM

## 2025-08-11 PROCEDURE — 3078F DIAST BP <80 MM HG: CPT | Performed by: FAMILY MEDICINE

## 2025-08-11 PROCEDURE — 3077F SYST BP >= 140 MM HG: CPT | Performed by: FAMILY MEDICINE

## 2025-08-11 PROCEDURE — 1126F AMNT PAIN NOTED NONE PRSNT: CPT | Performed by: FAMILY MEDICINE

## 2025-08-11 PROCEDURE — 1036F TOBACCO NON-USER: CPT | Performed by: FAMILY MEDICINE

## 2025-08-11 PROCEDURE — 99214 OFFICE O/P EST MOD 30 MIN: CPT | Performed by: FAMILY MEDICINE

## 2025-08-11 PROCEDURE — 1159F MED LIST DOCD IN RCRD: CPT | Performed by: FAMILY MEDICINE

## 2025-08-11 PROCEDURE — 3008F BODY MASS INDEX DOCD: CPT | Performed by: FAMILY MEDICINE

## 2025-08-11 RX ORDER — AMOXICILLIN 500 MG/1
CAPSULE ORAL
COMMUNITY
Start: 2024-09-03

## 2025-08-11 RX ORDER — ROSUVASTATIN CALCIUM 5 MG/1
5 TABLET, COATED ORAL DAILY
Qty: 90 TABLET | Refills: 1 | Status: SHIPPED | OUTPATIENT
Start: 2025-08-11 | End: 2026-02-07

## 2025-08-11 RX ORDER — CHLORHEXIDINE GLUCONATE ORAL RINSE 1.2 MG/ML
SOLUTION DENTAL
COMMUNITY
Start: 2024-09-05 | End: 2025-08-11 | Stop reason: WASHOUT

## 2025-08-11 ASSESSMENT — PAIN SCALES - GENERAL: PAINLEVEL_OUTOF10: 0-NO PAIN

## 2025-08-25 ENCOUNTER — APPOINTMENT (OUTPATIENT)
Dept: PRIMARY CARE | Facility: CLINIC | Age: 70
End: 2025-08-25
Payer: COMMERCIAL

## 2025-12-04 ENCOUNTER — APPOINTMENT (OUTPATIENT)
Dept: CARDIOLOGY | Facility: CLINIC | Age: 70
End: 2025-12-04
Payer: COMMERCIAL

## 2026-01-14 ENCOUNTER — APPOINTMENT (OUTPATIENT)
Dept: PRIMARY CARE | Facility: CLINIC | Age: 71
End: 2026-01-14
Payer: COMMERCIAL

## 2026-06-03 ENCOUNTER — APPOINTMENT (OUTPATIENT)
Dept: CARDIOLOGY | Facility: CLINIC | Age: 71
End: 2026-06-03
Payer: COMMERCIAL